# Patient Record
Sex: MALE | Race: WHITE | URBAN - METROPOLITAN AREA
[De-identification: names, ages, dates, MRNs, and addresses within clinical notes are randomized per-mention and may not be internally consistent; named-entity substitution may affect disease eponyms.]

---

## 2017-01-23 NOTE — TELEPHONE ENCOUNTER
Pt needs appt before any refills. Also will need labs. Has not been here in over a year. Please have them call to schedule so we can fill and order labs. Could also suggest him to have a px.  Thanks

## 2017-01-26 RX ORDER — LEVOTHYROXINE SODIUM 125 UG/1
TABLET ORAL
Qty: 45 TABLET | Refills: 0 | OUTPATIENT
Start: 2017-01-26

## 2017-04-05 ENCOUNTER — TELEPHONE (OUTPATIENT)
Dept: FAMILY MEDICINE CLINIC | Facility: CLINIC | Age: 37
End: 2017-04-05

## 2017-04-05 NOTE — TELEPHONE ENCOUNTER
A Medical Records Release of Information Request Form was rec'd via fax on 3/30/17 from [email or web address] on behalf of [employer]. 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   Pt's Release e-signed

## 2023-05-12 ENCOUNTER — TELEPHONE (OUTPATIENT)
Dept: FAMILY MEDICINE CLINIC | Facility: CLINIC | Age: 43
End: 2023-05-12

## 2023-05-18 ENCOUNTER — PATIENT OUTREACH (OUTPATIENT)
Dept: CASE MANAGEMENT | Age: 43
End: 2023-05-18

## 2023-05-18 NOTE — PROCEDURES
The office order for PCP removal request is Approved and finalized on May 18, 2023.     Thanks,  [facility] [employer]